# Patient Record
Sex: FEMALE | URBAN - METROPOLITAN AREA
[De-identification: names, ages, dates, MRNs, and addresses within clinical notes are randomized per-mention and may not be internally consistent; named-entity substitution may affect disease eponyms.]

---

## 2024-06-12 NOTE — PROGRESS NOTES
[facility]  Outpatient Clinic Note    Patient: Vanna Carmona  Age/Sex: 28 y.o., female  MRN: [id number]  Referred by:      Chief Complaint: Recurrent UTIs         History of Present Illness  This is a 28 y.o. female,  who presents as a new patient to the clinic for recurrent UTIs.  The patient stated that she has been seeing her PCP and that individual would perform urinalysis, she she believes that cultures were not sent out.  She has symptoms of lower back pain usually starting in the center of her lower back that radiates to her right hip usually.  She notes there was a renal ultrasound performed and showed trabeculations of her bladder. She denies dysuria, gross hematuria, flank pain, pelvic pain, vaginal bulging, fever or chills.  She stated her menses are usually regular, but have been more crampy since being off of testosterone.  She previously was on testosterone working with a provider in transgender care in [place].  The patient stated she is not able to insert tampons due to the pain.  The patient stated her bowel movements are constipated, she has not tried a stool softener.  She has never been sexually active choice.  She denies any pelvic surgeries.          Gyn History:  - Menopausal: No           Postmenopausal bleeding: No  - Hysterectomy: No  - Pap up to date: Yes - 3 years ago    History of abnormal pap: No  - Sexually active:  No  Dyspareunia: No   Other issues: none   - Number of prior vaginal deliveries: 0  - Number of prior c-sections: 0        Past Medical & Surgical History  No past medical history on file.  No past surgical history on file.    Family History  No family history on file.    Social History  She has no history on file for tobacco use, alcohol use, and drug use.    Allergies  Patient has no allergy information on record.    Medications:  No current outpatient medications on file prior to visit.     No current facility-administered medications on file prior to visit.         Review of Systems   A comprehensive 10+ review of systems was negative except for: see hpi          Physical Exam                                                                                                                      General: Well developed, well nourished, alert and cooperative, appears in no acute distress  Head: Normocephalic, atraumatic  Neck: supple, trachea midline  Eyes: Non-injected conjunctiva, sclera clear, no proptosis  Cardiac: Extremities are warm and well perfused. No edema, cyanosis or pallor.   Lungs: Breathing is easy, non-labored. Speaking in clear and complete sentences. Normal diaphragmatic movement.  Abdomen: soft, non-distended, non-tender, no rebound or guarding, no hernia and no CVA tenderness   MSK: Ambulatory with steady gait, unassisted  Neuro: alert and oriented to person, place and time  Psych: Demonstrates good judgement and reason, without hallucinations, abnormal affect or abnormal behaviors.  Skin: no obvious lesions, no rashes  Pelvic: Patient declined    PVR (by Ultrasound): 0mL   Urine dip:   Recent Results (from the past 6 hour(s))   POCT UA Automated manually resulted    Collection Time: 06/13/24  8:09 AM   Result Value Ref Range    POC Glucose, Urine NEGATIVE NEGATIVE mg/dl    POC Bilirubin, Urine NEGATIVE NEGATIVE    POC Ketones, Urine NEGATIVE NEGATIVE mg/dl    POC Specific Gravity, Urine <=1.005 1.005 - 1.035    POC Blood, Urine NEGATIVE NEGATIVE    POC PH, Urine 5.5 No Reference Range Established PH    POC Protein, Urine NEGATIVE NEGATIVE, 30 (1+) mg/dl    POC Urobilinogen, Urine 0.2 0.2, 1.0 EU/DL    Poc Nitrite, Urine NEGATIVE NEGATIVE    POC Leukocytes, Urine NEGATIVE NEGATIVE       Labs  N/A    Imaging  N/A      IMPRESSION AND PLAN:  Vanna Carmona is a 28 y.o. with pelvic floor dysfunction and constipation.  Patient given referral to Dr. Mesa and Dr. Garvin for future transgender care. She is interested in a possible hysterectomy in the future, we  discussed options to be schedule an appointment with Dr. Hurley.     Pelvic floor dysfunction  -start pelvic floor physical therapy  -we discussed other treatment options after pelvic floor physical therapy such as vaginal valium, pelvic floor injections with Dr. Hurley  -Standing urine culture in  system     Constipation  -Smooth move tea    Follow up in 3 months    All questions and concerns were answered and addressed.  The patient expressed understanding and agrees with the plan.     Reviewed and approved by CANDELARIO CHAUDHRY on 6/13/24 at 8:11 AM.

## 2024-06-13 ENCOUNTER — APPOINTMENT (OUTPATIENT)
Dept: UROLOGY | Facility: CLINIC | Age: 29
End: 2024-06-13
Payer: MEDICAID

## 2024-06-13 VITALS — HEART RATE: 94 BPM | SYSTOLIC BLOOD PRESSURE: 118 MMHG | DIASTOLIC BLOOD PRESSURE: 79 MMHG

## 2024-06-13 DIAGNOSIS — M62.89 PELVIC FLOOR DYSFUNCTION: Primary | ICD-10-CM

## 2024-06-13 DIAGNOSIS — K59.00 CONSTIPATION, UNSPECIFIED CONSTIPATION TYPE: ICD-10-CM

## 2024-06-13 LAB
POC BILIRUBIN, URINE: NEGATIVE
POC BLOOD, URINE: NEGATIVE
POC GLUCOSE, URINE: NEGATIVE MG/DL
POC KETONES, URINE: NEGATIVE MG/DL
POC LEUKOCYTES, URINE: NEGATIVE
POC NITRITE,URINE: NEGATIVE
POC PH, URINE: 5.5 PH
POC PROTEIN, URINE: NEGATIVE MG/DL
POC SPECIFIC GRAVITY, URINE: <=1.005
POC UROBILINOGEN, URINE: 0.2 EU/DL

## 2024-06-13 PROCEDURE — 81003 URINALYSIS AUTO W/O SCOPE: CPT

## 2024-06-13 PROCEDURE — 51798 US URINE CAPACITY MEASURE: CPT

## 2024-06-13 PROCEDURE — 2000F BLOOD PRESSURE MEASURE: CPT

## 2024-06-13 PROCEDURE — 99204 OFFICE O/P NEW MOD 45 MIN: CPT

## 2024-06-13 RX ORDER — LEVETIRACETAM 500 MG/1
TABLET ORAL
COMMUNITY

## 2024-06-13 RX ORDER — ACETAMINOPHEN 500 MG
TABLET ORAL EVERY 6 HOURS PRN
COMMUNITY

## 2024-06-13 RX ORDER — LEVOTHYROXINE SODIUM 100 UG/1
100 TABLET ORAL DAILY
COMMUNITY

## 2024-06-13 RX ORDER — IBUPROFEN 800 MG/1
800 TABLET ORAL EVERY 8 HOURS PRN
COMMUNITY

## 2024-06-13 RX ORDER — DULOXETIN HYDROCHLORIDE 20 MG/1
20 CAPSULE, DELAYED RELEASE ORAL DAILY
COMMUNITY

## 2024-06-13 RX ORDER — GUANFACINE 2 MG/1
TABLET ORAL
COMMUNITY

## 2024-06-13 RX ORDER — LAMOTRIGINE 100 MG/1
TABLET ORAL
COMMUNITY